# Patient Record
Sex: FEMALE | Race: AMERICAN INDIAN OR ALASKA NATIVE | NOT HISPANIC OR LATINO | Employment: PART TIME | URBAN - METROPOLITAN AREA
[De-identification: names, ages, dates, MRNs, and addresses within clinical notes are randomized per-mention and may not be internally consistent; named-entity substitution may affect disease eponyms.]

---

## 2018-11-21 ENCOUNTER — COMMUNICATION - HEALTHEAST (OUTPATIENT)
Dept: PALLIATIVE MEDICINE | Facility: CLINIC | Age: 43
End: 2018-11-21

## 2022-02-17 ENCOUNTER — APPOINTMENT (OUTPATIENT)
Dept: CT IMAGING | Facility: HOSPITAL | Age: 47
End: 2022-02-17
Attending: EMERGENCY MEDICINE

## 2022-02-17 ENCOUNTER — HOSPITAL ENCOUNTER (EMERGENCY)
Facility: HOSPITAL | Age: 47
Discharge: HOME | End: 2022-02-17
Admitting: EMERGENCY MEDICINE

## 2022-02-17 VITALS
BODY MASS INDEX: 30.49 KG/M2 | OXYGEN SATURATION: 97 % | HEIGHT: 65 IN | DIASTOLIC BLOOD PRESSURE: 76 MMHG | RESPIRATION RATE: 18 BRPM | TEMPERATURE: 98.3 F | HEART RATE: 73 BPM | WEIGHT: 183 LBS | SYSTOLIC BLOOD PRESSURE: 158 MMHG
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
## 2022-02-17 DIAGNOSIS — S39.92XA TRAUMATIC INJURY OF BACK: ICD-10-CM

## 2022-02-17 DIAGNOSIS — R03.0 ELEVATED BLOOD PRESSURE READING WITHOUT DIAGNOSIS OF HYPERTENSION: ICD-10-CM

## 2022-02-17 PROCEDURE — 72131 CT LUMBAR SPINE W/O DYE: CPT

## 2022-02-17 PROCEDURE — 99284 EMERGENCY DEPT VISIT MOD MDM: CPT | Mod: 25

## 2022-02-17 PROCEDURE — 250N000013 HC RX MED GY IP 250 OP 250 PS 637: Performed by: EMERGENCY MEDICINE

## 2022-02-17 RX ORDER — OXYCODONE AND ACETAMINOPHEN 5; 325 MG/1; MG/1
1 TABLET ORAL ONCE
Status: COMPLETED | OUTPATIENT
Start: 2022-02-17 | End: 2022-02-17

## 2022-02-17 RX ORDER — OXYCODONE AND ACETAMINOPHEN 5; 325 MG/1; MG/1
1 TABLET ORAL EVERY 6 HOURS PRN
Qty: 8 TABLET | Refills: 0 | Status: SHIPPED | OUTPATIENT
Start: 2022-02-17 | End: 2022-02-20

## 2022-02-17 RX ADMIN — OXYCODONE HYDROCHLORIDE AND ACETAMINOPHEN 1 TABLET: 5; 325 TABLET ORAL at 14:37

## 2022-02-17 ASSESSMENT — ENCOUNTER SYMPTOMS
ABDOMINAL PAIN: 0
WEAKNESS: 0
DIZZINESS: 0
NAUSEA: 0
NUMBNESS: 0
WOUND: 0
BACK PAIN: 1
NECK PAIN: 0
VOMITING: 0
SHORTNESS OF BREATH: 0
HEADACHES: 0

## 2022-02-17 NOTE — Clinical Note
Opal Rodriguez was seen and treated in our emergency department on 2/17/2022.  She may return to work on 02/21/2022.       If you have any questions or concerns, please don't hesitate to call.      Shantell Sandoval PA-C

## 2022-02-17 NOTE — ED PROVIDER NOTES
Emergency Department Encounter   NAME: Opal Rodriguez ; AGE: 46 year old female ; YOB: 1975 ; MRN: [id number] ; PCP: Tiffany, [facility]   ED PROVIDER: Shantell Sandoval PA-C    Evaluation Date & Time:   No admission date for patient encounter.    CHIEF COMPLAINT:  Fall      Impression and Plan   MDM:   Opal Rodriguez is a 46 year old female with a pertinent history of GERD, cervical spine fusion, who presents to the ED by self for evaluation of back pain following a fall. The patient presents to the emergency department for evaluation of low back pain after slipping and falling on the ice this afternoon. She denies hitting her head, LOC, headache or neck pain. Head is atraumatic, she is oriented with a GCS of 15 and has no midline cervical spinal tenderness or palpable bony step-offs -no concern at this time for traumatic head or cervical spine injury. She does have reproducible tenderness over her midline lumbar spine and into her right SI joint. No hip or pelvic tenderness. No flank tenderness or abdominal tenderness to suggest retroperitoneal or intra-abdominal injury. She has no focal neurologic deficits on exam or red flag symptoms to suggest cauda equina syndrome -no indication for emergent MRI imaging, however CT of her lumbar spine ordered to rule out traumatic compression fracture and subluxation. The remainder of her exam is unremarkable.    Lumbar CT returned negative for acute injury. Pain is likely due to bruising and soft tissue injury. We discussed supportive measures for home, icing, stretching, and Tylenol and Motrin for pain relief. I will give her a small prescription for Percocet and a work note through the weekend. Reviewed the importance of follow-up in clinic for her back pain and her elevated BP, as well as concerning signs and symptoms to return to the ED. She verbalized understanding is comfortable with the plan. Discharged home in good condition.    *Patient  examination and workup was initiated in triage due to Emergency Department bed shortage during the COVID-19 pandemic. Patient and/or guardian's consent was obtained.     ED COURSE:  4:46 PM I met and introduced myself to the patient. I gathered initial history and performed my physical exam. We discussed plan for initial workup.   5:07 PM We discussed discharge, follow up and reasons to return to the ED.     At the conclusion of the encounter I discussed the results of all the tests and the disposition. The questions were answered. The patient or family acknowledged understanding and was agreeable with the care plan.    FINAL IMPRESSION:    ICD-10-CM    1. Fall, initial encounter  W19.XXXA    2. Traumatic injury of back  S39.92XA    3. Elevated blood pressure reading without diagnosis of hypertension  R03.0          MEDICATIONS GIVEN IN THE EMERGENCY DEPARTMENT:  Medications   oxyCODONE-acetaminophen (PERCOCET) 5-325 MG per tablet 1 tablet (1 tablet Oral Given 2/17/22 1437)         NEW PRESCRIPTIONS STARTED AT TODAY'S ED VISIT:  New Prescriptions    OXYCODONE-ACETAMINOPHEN (PERCOCET) 5-325 MG TABLET    Take 1 tablet by mouth every 6 hours as needed for pain         HPI   Patient information was obtained from: Patient    Use of Intrepreter: N/A    Opal Rodriguez is a 46 year old female with a pertinent history of GERD, cervical spine fusion, who presents to the ED by self for evaluation of back pain following a fall.     The patient presented to the emergency department for evaluation of low back pain after a slip and fall on the ice that occurred around 1:30 PM this afternoon. She delivers mail for a living, and was walking down a road, when she slipped on an icy patch, and her feet went up in the air, landing on her back. She denies hitting her head, LOC, headache or neck pain. She initially had some mild left elbow pain which has since resolved, however is continuing to experience low back pain. She does not  "have a history of back problems or previous injury. She has been urinating normally since the incident and has not had any loss of control of bladder or bowel. No weakness or numbness in her legs or numbness in her groin. She is not anticoagulated. She was given a Percocet well in triage and she did feel that this calmed her pain.    REVIEW OF SYSTEMS:  Review of Systems   Eyes: Negative for visual disturbance.   Respiratory: Negative for shortness of breath.    Cardiovascular: Negative for chest pain.   Gastrointestinal: Negative for abdominal pain, nausea and vomiting.   Genitourinary: Negative.    Musculoskeletal: Positive for back pain. Negative for neck pain.   Skin: Negative for wound.   Neurological: Negative for dizziness, syncope, weakness, numbness and headaches.   All other systems reviewed and are negative.        Medical History     No past medical history on file.    No past surgical history on file.    No family history on file.    Social History     Tobacco Use     Smoking status: Not on file     Smokeless tobacco: Not on file   Substance Use Topics     Alcohol use: Not on file     Drug use: Not on file       oxyCODONE-acetaminophen (PERCOCET) 5-325 MG tablet  furosemide (LASIX) 40 MG tablet          Physical Exam     First Vitals:  Patient Vitals for the past 24 hrs:   BP Temp Pulse Resp SpO2 Height Weight   02/17/22 1430 (!) 158/76 98.3  F (36.8  C) 73 18 97 % 1.638 m (5' 4.5\") 83 kg (183 lb)         PHYSICAL EXAM:   Physical Exam  Vitals and nursing note reviewed.   Constitutional:       General: She is not in acute distress.     Appearance: Normal appearance. She is not toxic-appearing.   HENT:      Head: Normocephalic and atraumatic.   Eyes:      Conjunctiva/sclera: Conjunctivae normal.   Neck:      Comments: No cervical spine tenderness or palpable bony step-offs.  Pulmonary:      Effort: Pulmonary effort is normal.   Abdominal:      General: Abdomen is flat. There is no distension.      " Palpations: Abdomen is soft.      Tenderness: There is no abdominal tenderness. There is no guarding or rebound.   Musculoskeletal:      Cervical back: Normal range of motion and neck supple.      Comments: Midline lumbar spinal tenderness as well as tenderness to the left SI joint. No palpable bony step-offs. No ecchymosis or edema. No flank tenderness. Gait is intact. 5 out of 5 strength with hip flexion, knee flexion extension, and dorsiflexion and plantar flexion. Sensation to bilateral lower extremities intact. 2+ patellar reflexes. No pelvic tenderness.   Skin:     General: Skin is warm and dry.   Neurological:      Mental Status: She is alert and oriented to person, place, and time. Mental status is at baseline.             Results     LAB:  All pertinent labs reviewed and interpreted  Labs Ordered and Resulted from Time of ED Arrival to Time of ED Departure - No data to display    RADIOLOGY:  Lumbar spine CT w/o contrast   Final Result   IMPRESSION:     1.  No evidence of acute fracture or subluxation of the lumbar spine by CT imaging.    2.  Degenerative lumbar spondylosis with level by level analysis as described above.   3.  Nonobstructing left renal calculus, 3 mm.            Shantell Sandoval PA-C   Emergency Medicine   [facility] EMERGENCY DEPARTMENT       Shantell Sandoval PA-C  02/17/22 1742       Shantell Sandoval PA-C  02/17/22 1742

## 2022-02-17 NOTE — ED PROVIDER NOTES
"ED Provider In Triage Note  [facility]  Encounter Date: Feb 17, 2022    Chief Complaint   Patient presents with     Fall       Brief HPI:   Opal Rodriguez is a 46 year old female presenting to the Emergency Department with a chief complaint of low back pain after falling on the ice at work. Slipped and injured low back (landed on back). No numbness or weakness. Left elbow pain. Tubal ligation. Not pregnant. Not on blood thinners.     Brief Physical Exam:  BP (!) 158/76   Pulse 73   Temp 98.3  F (36.8  C)   Resp 18   Ht 1.638 m (5' 4.5\")   Wt 83 kg (183 lb)   SpO2 97%   BMI 30.93 kg/m    General: Non-toxic appearing  HEENT: Atraumatic  Resp: No respiratory distress  Abdomen: Non-peritoneal   Midline lumbar tenderness. No thoracic tenderness. No post rib tenderness. FROM of left elbow.   Neuro: Alert, oriented, answers questions appropriately  Psych: Behavior appropriate      Plan Initiated in Triage:  Orders Placed This Encounter     Lumbar spine CT w/o contrast     oxyCODONE-acetaminophen (PERCOCET) 5-325 MG per tablet 1 tablet       PIT Dispo:   Return to lobby while awaiting workup and ED bed availability    Yoshi Dye MD on 2/17/2022 at 2:33 PM    Patient was evaluated by the Physician in Triage due to a limitation of available rooms in the Emergency Department. A plan of care was discussed based on the information obtained on the initial evaluation and patient was consuled to return back to the Emergency Department lobby after this initial evalutaiton until results were obtained or a room became available in the Emergency Department. Patient was counseled not to leave prior to receiving the results of their workup.        Yoshi Dye MD  02/17/22 1435    "

## 2022-02-17 NOTE — DISCHARGE INSTRUCTIONS
As we discussed, your CT showed no concerning findings.  Your pain is likely due to bruising.  Please rest, attempt gentle stretching, and use ice or heat for discomfort.  Please use ibuprofen for pain relief and I will give you several tablets of Percocet which is oxycodone with Tylenol for breakthrough pain at home.  This is a strong pain medication and can make you drowsy.  Please do not take this or working, driving, operating heavy machinery.  If at anytime you develop numbness in your groin, loss of control of your bladder or bowel, weakness or numbness in your legs, or uncontrollable pain, please return to the ER for further evaluation.  Otherwise, please follow-up closely with your primary care provider as we discussed next week.    Your blood pressure was elevated in the emergencydepartment today and requires recheck and close follow-up in your primary care clinic. Untreated blood pressure can cause serious complications including, but not limited to stroke, heart attack/failure, and kidney disease.  Please make a close follow-up appointment to have this recheck performed. Please return to the emergency department immediately if you develop a severe headache, vision changes, chest pain, shortness of breath, orabdominal pain.

## 2022-02-17 NOTE — ED TRIAGE NOTES
Patient ambualtory to triage, , slipped on ice and fell landing on back, here for back pain, non radiating. No LOC, not on blood thinners